# Patient Record
Sex: MALE | Race: WHITE | NOT HISPANIC OR LATINO | Employment: FULL TIME | URBAN - METROPOLITAN AREA
[De-identification: names, ages, dates, MRNs, and addresses within clinical notes are randomized per-mention and may not be internally consistent; named-entity substitution may affect disease eponyms.]

---

## 2023-11-01 PROBLEM — A63.0 CONDYLOMA ACUMINATUM: Status: ACTIVE | Noted: 2023-11-01

## 2023-11-01 PROBLEM — N50.89 TESTICULAR MASS: Status: ACTIVE | Noted: 2023-11-01

## 2023-11-01 PROBLEM — S67.10XA: Status: ACTIVE | Noted: 2023-11-01

## 2023-11-02 ENCOUNTER — OFFICE VISIT (OUTPATIENT)
Dept: ORTHOPEDIC SURGERY | Facility: CLINIC | Age: 58
End: 2023-11-02
Payer: COMMERCIAL

## 2023-11-02 VITALS — HEIGHT: 67 IN | BODY MASS INDEX: 26.68 KG/M2 | WEIGHT: 170 LBS

## 2023-11-02 DIAGNOSIS — S67.10XD CRUSHING INJURY OF DISTAL FINGER, SUBSEQUENT ENCOUNTER: Primary | ICD-10-CM

## 2023-11-02 PROCEDURE — 99213 OFFICE O/P EST LOW 20 MIN: CPT | Performed by: ORTHOPAEDIC SURGERY

## 2023-11-02 PROCEDURE — 1036F TOBACCO NON-USER: CPT | Performed by: ORTHOPAEDIC SURGERY

## 2023-11-02 NOTE — PROGRESS NOTES
Patient returns to follow-up on his left ring fingertip injury and presents today for evaluation of his nail regeneration. He is concerned because as the nail has started to grow, it appears that it may become an ingrown fingernail because the skin and the soft tissues at the distal end of the finger are a bit prominent.     Past medical history, medications, allergies, surgical history and review of systems are reviewed and otherwise unchanged when compared to last visit on 5/15/2023.    Physical Examination Findings:   Constitutional: Oriented to person, place, and time. Appears well-developed and well-nourished.   Head: Normocephalic and atraumatic.   Eyes: Pupils are equal, round, and reactive to light.   Cardiovascular: Intact distal pulses.   Pulmonary/Chest/Breast: Effort normal. No respiratory distress.   Neurological: Alert and oriented to person, place, and time.   Skin: Skin is warm and dry.   Psychiatric: Normal mood and affect. Behavior is normal.   Musculoskeletal: Left hand examination reveals a bulbous appearing hyponychium. His nail plate has regrown nicely but now, the distal end of the nail plate is abutting up against the proximal prominence of the hyponychium. There is no sign of infection at this time. Sensation is intact to light touch.     Impression: Sequela of crush injury to the left ring finger    Plan: We have discussed nail hygiene techniques with recommendations that he try to elevate the distal end of the nail plate to see if he can get it to grow up over the top of the prominent hyponychium skin. If this is successful, then it is unlikely that he will develop any symptomatic ingrown nail. If it does not work and he starts to have symptoms related to ingrown nails, we may have to consider partial or complete nail matrix ablation. He will return to see me in two months for repeat clinical examination.     Andrei Huerta MD    [employer]  [employer]  Department of Orthopaedic Surgery  Chief of Hand and Upper Extremity Surgery  [facility]    By signing my name below, I, Indu Garcia, attest that this documentation has been prepared under the direction and in the presence of Dr. Andrei Huerta.   All medical record entries made by the Indu were at my direction and personally dictated by me. I have reviewed the chart and agree that the record accurately reflects my personal performance of the history, physical exam, discussion and plan.

## 2024-01-11 ENCOUNTER — APPOINTMENT (OUTPATIENT)
Dept: ORTHOPEDIC SURGERY | Facility: CLINIC | Age: 59
End: 2024-01-11
Payer: COMMERCIAL

## 2024-01-30 ENCOUNTER — OFFICE VISIT (OUTPATIENT)
Dept: ORTHOPEDIC SURGERY | Facility: CLINIC | Age: 59
End: 2024-01-30
Payer: COMMERCIAL

## 2024-01-30 ENCOUNTER — HOSPITAL ENCOUNTER (OUTPATIENT)
Dept: RADIOLOGY | Facility: CLINIC | Age: 59
Discharge: HOME | End: 2024-01-30
Payer: COMMERCIAL

## 2024-01-30 VITALS — WEIGHT: 170 LBS | BODY MASS INDEX: 26.68 KG/M2 | HEIGHT: 67 IN

## 2024-01-30 DIAGNOSIS — S67.10XD CRUSHING INJURY OF DISTAL FINGER, SUBSEQUENT ENCOUNTER: ICD-10-CM

## 2024-01-30 DIAGNOSIS — S67.10XD CRUSHING INJURY OF DISTAL FINGER, SUBSEQUENT ENCOUNTER: Primary | ICD-10-CM

## 2024-01-30 PROCEDURE — 73140 X-RAY EXAM OF FINGER(S): CPT | Mod: LEFT SIDE | Performed by: RADIOLOGY

## 2024-01-30 PROCEDURE — 99213 OFFICE O/P EST LOW 20 MIN: CPT | Performed by: ORTHOPAEDIC SURGERY

## 2024-01-30 PROCEDURE — 1036F TOBACCO NON-USER: CPT | Performed by: ORTHOPAEDIC SURGERY

## 2024-01-30 PROCEDURE — 73140 X-RAY EXAM OF FINGER(S): CPT | Mod: LT

## 2024-01-30 ASSESSMENT — PAIN SCALES - GENERAL: PAINLEVEL_OUTOF10: 5 - MODERATE PAIN

## 2024-01-30 ASSESSMENT — PAIN - FUNCTIONAL ASSESSMENT: PAIN_FUNCTIONAL_ASSESSMENT: 0-10

## 2024-01-30 ASSESSMENT — PAIN DESCRIPTION - DESCRIPTORS: DESCRIPTORS: ACHING

## 2024-01-30 NOTE — PROGRESS NOTES
Patient ID: Jermaine Peña is a 58 y.o. male patient who presents today for Follow-up of the Left Ring Finger.     The patient returns in office today for a follow-up for a crushing injury of left ring finger from last year. He reports that some days are worse than others, and compression such as wearing gloves aggravates the pain. The patient also reports pain and sensitivity and issues to nail growth. He saw Dr. Hannah for a second opinion who took images showing a distal phalanx tip fracture and deformity in the alignment in tip of distal phalanx. He offered a procedure to recontour and reshape finger.     Past medical history, medications, allergies, surgical history and review of systems are reviewed and otherwise unchanged when compared to last visit on 11/2/2023.          Examination:     Constitutional: Oriented to person, place, and time.     Appears well-developed and well-nourished.     Head: Normocephalic and atraumatic.     Eyes: Pupils are equal, round, and reactive to light.     Cardiovascular: Intact distal pulses.     Pulmonary/Chest/Breast: Effort normal. No respiratory distress.     Neurological: Alert and oriented to person, place, and time.     Skin: Skin is warm and dry.     Psychiatric: normal mood and affect. Behavior is normal.     Musculoskeletal: Left ring finger with dorsal soft tissue prominence at the hyponychium.  Early nail plate detachment from the sterile matrix.  Findings concerning for ingrown nails on the radial and ulnar margins of the nail plate distally into the hyponychium.  Full IP joint range of motion.  No signs of infection.       X-rays of the left ring finger taken today demonstrate dorsal prominence to the distal phalanx              Impression: Left Ring Finger Tuft Fracture with Malunion     Plan:     Dorsal bony prominence to recontour soft tissues to further facilitate nail growth, under local anesthesia was discussed with patient. He was told he may progressively  return to normal activities, such as playing the guitar, after 3-4 weeks     Patient will call office to schedule bony debridement later this spring.      For Surgical Planning:  Diagnosis: Left ring finger distal phalanx with malunion  Procedure: Bony debridement left ring finger distal phalanx  CPT: 20441  Anesthesia: Local anesthesia only  Duration: 45 minutes  Special Equipment Needed: Mini C arm  Medical Notes / PM / DM / PAT needed?:  None  Location: [place]urb  Initial Post Operative Visit: 2 weeks      Andrei Huerta MD          [facility] [employer]     Department of Orthopaedic Surgery     Chief of Hand and Upper Extremity Surgery     [facility]     Scribe Attestation  By signing my name below, I, Kp Moorecallie, Scribe   attest that this documentation has been prepared under the direction and in the presence of Andrei Huerta MD.

## 2024-03-28 ENCOUNTER — HOSPITAL ENCOUNTER (OUTPATIENT)
Facility: CLINIC | Age: 59
Setting detail: OUTPATIENT SURGERY
End: 2024-03-28
Attending: ORTHOPAEDIC SURGERY | Admitting: ORTHOPAEDIC SURGERY
Payer: COMMERCIAL

## 2024-03-28 DIAGNOSIS — S67.10XD CRUSHING INJURY OF DISTAL FINGER, SUBSEQUENT ENCOUNTER: ICD-10-CM

## 2024-04-02 ENCOUNTER — APPOINTMENT (OUTPATIENT)
Dept: RADIOLOGY | Facility: HOSPITAL | Age: 59
End: 2024-04-02
Payer: COMMERCIAL

## 2024-04-02 ENCOUNTER — HOSPITAL ENCOUNTER (EMERGENCY)
Facility: HOSPITAL | Age: 59
Discharge: HOME | End: 2024-04-02
Payer: COMMERCIAL

## 2024-04-02 VITALS
DIASTOLIC BLOOD PRESSURE: 83 MMHG | OXYGEN SATURATION: 96 % | HEART RATE: 95 BPM | HEIGHT: 67 IN | SYSTOLIC BLOOD PRESSURE: 141 MMHG | RESPIRATION RATE: 15 BRPM | BODY MASS INDEX: 26.68 KG/M2 | TEMPERATURE: 98.7 F | WEIGHT: 170 LBS

## 2024-04-02 DIAGNOSIS — S92.502B: ICD-10-CM

## 2024-04-02 DIAGNOSIS — S91.212A LACERATION OF LEFT GREAT TOE WITHOUT FOREIGN BODY WITH DAMAGE TO NAIL, INITIAL ENCOUNTER: Primary | ICD-10-CM

## 2024-04-02 PROCEDURE — 2500000001 HC RX 250 WO HCPCS SELF ADMINISTERED DRUGS (ALT 637 FOR MEDICARE OP): Performed by: PHYSICIAN ASSISTANT

## 2024-04-02 PROCEDURE — 73630 X-RAY EXAM OF FOOT: CPT | Mod: LT

## 2024-04-02 PROCEDURE — 73630 X-RAY EXAM OF FOOT: CPT | Mod: LEFT SIDE | Performed by: RADIOLOGY

## 2024-04-02 PROCEDURE — 2500000005 HC RX 250 GENERAL PHARMACY W/O HCPCS: Performed by: PHYSICIAN ASSISTANT

## 2024-04-02 PROCEDURE — 11760 REPAIR OF NAIL BED: CPT | Mod: T3

## 2024-04-02 PROCEDURE — 99283 EMERGENCY DEPT VISIT LOW MDM: CPT

## 2024-04-02 RX ORDER — HYDROCODONE BITARTRATE AND ACETAMINOPHEN 5; 325 MG/1; MG/1
1 TABLET ORAL EVERY 6 HOURS PRN
Qty: 12 TABLET | Refills: 0 | Status: SHIPPED | OUTPATIENT
Start: 2024-04-02 | End: 2024-04-05

## 2024-04-02 RX ORDER — LIDOCAINE HYDROCHLORIDE 10 MG/ML
5 INJECTION INFILTRATION; PERINEURAL ONCE
Status: COMPLETED | OUTPATIENT
Start: 2024-04-02 | End: 2024-04-02

## 2024-04-02 RX ORDER — CEPHALEXIN 500 MG/1
500 CAPSULE ORAL 3 TIMES DAILY
Qty: 21 CAPSULE | Refills: 0 | Status: SHIPPED | OUTPATIENT
Start: 2024-04-02 | End: 2024-04-09

## 2024-04-02 RX ORDER — CEPHALEXIN 500 MG/1
500 CAPSULE ORAL ONCE
Status: COMPLETED | OUTPATIENT
Start: 2024-04-02 | End: 2024-04-02

## 2024-04-02 RX ORDER — NAPROXEN 500 MG/1
500 TABLET ORAL ONCE
Status: COMPLETED | OUTPATIENT
Start: 2024-04-02 | End: 2024-04-02

## 2024-04-02 RX ADMIN — LIDOCAINE HYDROCHLORIDE 50 MG: 10 INJECTION, SOLUTION INFILTRATION; PERINEURAL at 19:22

## 2024-04-02 RX ADMIN — CEPHALEXIN 500 MG: 500 CAPSULE ORAL at 19:18

## 2024-04-02 RX ADMIN — NAPROXEN 500 MG: 500 TABLET ORAL at 19:18

## 2024-04-02 ASSESSMENT — COLUMBIA-SUICIDE SEVERITY RATING SCALE - C-SSRS
1. IN THE PAST MONTH, HAVE YOU WISHED YOU WERE DEAD OR WISHED YOU COULD GO TO SLEEP AND NOT WAKE UP?: NO
6. HAVE YOU EVER DONE ANYTHING, STARTED TO DO ANYTHING, OR PREPARED TO DO ANYTHING TO END YOUR LIFE?: NO
2. HAVE YOU ACTUALLY HAD ANY THOUGHTS OF KILLING YOURSELF?: NO

## 2024-04-02 ASSESSMENT — PAIN SCALES - GENERAL
PAINLEVEL_OUTOF10: 8
PAINLEVEL_OUTOF10: 8

## 2024-04-02 ASSESSMENT — PAIN - FUNCTIONAL ASSESSMENT
PAIN_FUNCTIONAL_ASSESSMENT: 0-10
PAIN_FUNCTIONAL_ASSESSMENT: 0-10

## 2024-04-02 ASSESSMENT — PAIN DESCRIPTION - LOCATION: LOCATION: TOE (COMMENT WHICH ONE)

## 2024-04-02 ASSESSMENT — PAIN DESCRIPTION - ORIENTATION: ORIENTATION: LEFT

## 2024-04-02 NOTE — ED TRIAGE NOTES
PT TO ED FROM WORK WITH C/O LEFT FOOT/ANKLE INJURY FROM CRUSHING INJURY AT WORK TODAY WITH DIFFICULTY BEARING WEIGHT.

## 2024-04-02 NOTE — ED PROVIDER NOTES
HPI   Chief Complaint   Patient presents with    Foot Injury       History of present illness: 58-year-old male complains of left toe crush injury that occurred at work prior to arrival.  Patient states he had another group of individuals were carrying a piece of sheet metal and was dropped directly onto his left foot.  Indicates that he has moderate throbbing pain on his left fourth and fifth toe.  There is bleeding at the site.  Denies additional injuries.  Denies any paresthesias weakness or loss of function.   has had a tetanus shot update about 6 months ago.    Review of systems: Constitutional, eye, ENT, cardiovascular, respiratory, gastrointestinal, genitourinary, neurologic, musculoskeletal, dermatologic, hematologic, endocrine systems were evaluated and were negative unless otherwise specified in history of present illness.    Medications: Reviewed and per nursing note.    Family history: Denies relevant medical conditions.    Social history: Denies tobacco, alcohol, drug use.      Physical exam:    Appearance: Well-developed, well-nourished, nontoxic-appearing, alert and oriented x3. Talking in complete sentences.    HEENT:  Head normocephalic atraumatic,    NECK:  Nml Inspection    Respiratory: Clear to auscultation    Cardiovascular: Regular rate and rhythm. Capillary refill less than 3 seconds on all extremities.    Neuro:  Oriented x 3, Speech Clear, cranial nerves grossly intact. Normal sensation to light touch in all 4 extremities. Deep tendon reflexes 2+ symmetrically in upper and lower extremities.    Musculoskeletal: Patient spontaneously moves all 4 extremities with 5/5 muscle strength.    Skin: Laceration 2 cm on the left fourth toe and the distal phalanx that does not impact the nail mostly on the dorsal aspect with some on the volar aspect.  Left 4th nail avulsion.  Ecchymosis left fifth toe.  No subungual hematoma.                          Denisa Coma Scale Score: 15                      Patient History   Past Medical History:   Diagnosis Date    Personal history of other diseases of male genital organs     History of testicular mass     Past Surgical History:   Procedure Laterality Date    HERNIA REPAIR  05/03/2017    Hernia Repair     Family History   Problem Relation Name Age of Onset    Cancer Mother      Other (cardiac disorder) Father      Asthma Brother       Social History     Tobacco Use    Smoking status: Never    Smokeless tobacco: Never   Substance Use Topics    Alcohol use: Not on file    Drug use: Not on file       Physical Exam   ED Triage Vitals [04/02/24 1828]   Temperature Heart Rate Respirations BP   37.1 °C (98.7 °F) 95 15 141/83      Pulse Ox Temp src Heart Rate Source Patient Position   96 % -- -- --      BP Location FiO2 (%)     -- --       Physical Exam    ED Course & MDM   Diagnoses as of 04/02/24 2047   Laceration of left great toe without foreign body with damage to nail, initial encounter   Fracture of fourth toe, left, open, initial encounter       Medical Decision Making  XR foot left 3+ views   Final Result    1. Fractures of the 4th and 5th distal phalanges without significant    displacement.                      Signed by: Doug Patrick 4/2/2024 8:11 PM    Dictation workstation:   [id number]         Patient with crush injury left foot.  Differential diagnosis laceration fracture dislocation sprain strain contusion compartment syndrome neurovascular compromise.  Examination shows tenderness and laceration of the left fourth toe with ecchymosis of left fifth toe.  Tetanus shot up-to-date.    Foot was soaked in dilute Betadine.  X-ray of the left foot ordered.  Antibiotic prophylaxis initiated with Keflex and given naproxen for pain control.    Procedure:  Laceration repair  Laceration repair was performed by physician assistant.  Patient has laceration of the left 4th toe 2cm with nail avulsion.  Site was cleansed with Betadine and irrigated with sterile water.   1% lidocaine without epinephrine 2mL was injected subcutaneously as a field block.  After adequate analgesia site was cleansed with Betadine and irrigated with sterile water.  Nail replaced back into base.  No foreign body seen at base of bloodless field.  3 simple interrupted 5-0 Prolene sutures were placed with good approximation of tissue with stabilization of the nail.  Sterile dressing was applied with petroleum gauze, sterile gauze, mónica tape of fourth and fifth toe.  Patient tolerated rated procedure well.  Normal neurovascular exam after procedure.  Blood loss less than 5 mL.  Patient educated on wound care.  Sterile dressing applied given postop shoe and cane.    Patient will be discharged to home with prescription.  Patient is educated in signs and symptoms of worsening symptoms and reasons to come back to the emergency department.  Will need to follow up with primary care provider.  Patient does not report social determinants of health impacting ability to obtain care that is needed.  Patient agrees with plan.    This is a transcription.  Text was reviewed for errors, but some transcription errors may remain.  Please call for any questions.              Procedure  Procedures     Ryan Rand PA-C  04/02/24 1411

## 2024-04-10 ENCOUNTER — OFFICE VISIT (OUTPATIENT)
Dept: ORTHOPEDIC SURGERY | Facility: CLINIC | Age: 59
End: 2024-04-10
Payer: COMMERCIAL

## 2024-04-10 VITALS — HEIGHT: 67 IN | BODY MASS INDEX: 26.68 KG/M2 | WEIGHT: 170 LBS

## 2024-04-10 DIAGNOSIS — S92.919A CLOSED NONDISPLACED FRACTURE OF DISTAL PHALANX OF TOE: Primary | ICD-10-CM

## 2024-04-10 PROCEDURE — 99203 OFFICE O/P NEW LOW 30 MIN: CPT | Performed by: ORTHOPAEDIC SURGERY

## 2024-04-10 ASSESSMENT — PAIN - FUNCTIONAL ASSESSMENT: PAIN_FUNCTIONAL_ASSESSMENT: 0-10

## 2024-04-10 ASSESSMENT — PAIN DESCRIPTION - DESCRIPTORS: DESCRIPTORS: THROBBING

## 2024-04-10 ASSESSMENT — PAIN SCALES - GENERAL: PAINLEVEL_OUTOF10: 2

## 2024-04-10 NOTE — PROGRESS NOTES
58-year-old male here for left foot injury.  Has a manufacturing company.  Dropped a piece of sheet metal on his foot while wearing leather shoes last Friday.  Went to [facility] that evening.  Noted to have a fracture of the fourth toe and injury to the nailbed.  Some sutures were placed in.  Has had the dressing on the toe since then.  Is wearing a surgical shoe.    On exam:  WD/WN thin male  A+O X3  NAD  No lymphedema  Inspection of both feet and ankles show symmetric arches.   Sutures at the base of the fourth toe nailbed.  Crusting scab over tip of toe.  5/5 strength in all 4 planes.   Sensation intact to LT.   Good pulses.       I personally reviewed the following radiographic exams: X-ray left foot shows nondisplaced fracture fourth toe distal tuft.    Assessment: Left fourth toe fracture with nailbed injury.    Plan: Discussed nonoperative and operative options in detail.   Risk and benefits discussed in detail. All questions answered today.  Recovery timeline and expectations discussed in detail.  Okay to shower.  Soft dressing applied.  Follow-up on Monday for wound check and suture removal.  Continue surgical shoe.

## 2024-04-15 ENCOUNTER — OFFICE VISIT (OUTPATIENT)
Dept: ORTHOPEDIC SURGERY | Facility: CLINIC | Age: 59
End: 2024-04-15
Payer: COMMERCIAL

## 2024-04-15 DIAGNOSIS — S92.919A CLOSED NONDISPLACED FRACTURE OF DISTAL PHALANX OF TOE: Primary | ICD-10-CM

## 2024-04-15 PROCEDURE — 99213 OFFICE O/P EST LOW 20 MIN: CPT | Performed by: ORTHOPAEDIC SURGERY

## 2024-04-15 NOTE — PROGRESS NOTES
Returns for left foot.  Wearing regular shoes with a large toe box.    Exam: Nailbed in place.  Dried scab along distal tip of toe.  No deformity.    Assessment: Status post crush injury left fourth toe.    Plan: Sutures out.  Be careful with shoe wear for the next 3 to 4 weeks.  Follow-up as needed.

## 2024-04-30 ENCOUNTER — APPOINTMENT (OUTPATIENT)
Dept: ORTHOPEDIC SURGERY | Facility: CLINIC | Age: 59
End: 2024-04-30
Payer: COMMERCIAL